# Patient Record
Sex: MALE | URBAN - METROPOLITAN AREA
[De-identification: names, ages, dates, MRNs, and addresses within clinical notes are randomized per-mention and may not be internally consistent; named-entity substitution may affect disease eponyms.]

---

## 2017-10-07 ENCOUNTER — OUTPATIENT (OUTPATIENT)
Dept: OUTPATIENT SERVICES | Facility: HOSPITAL | Age: 47
LOS: 1 days | Discharge: HOME | End: 2017-10-07

## 2017-10-07 DIAGNOSIS — Z13.1 ENCOUNTER FOR SCREENING FOR DIABETES MELLITUS: ICD-10-CM

## 2018-12-26 ENCOUNTER — OUTPATIENT (OUTPATIENT)
Dept: OUTPATIENT SERVICES | Facility: HOSPITAL | Age: 48
LOS: 1 days | Discharge: HOME | End: 2018-12-26

## 2018-12-26 DIAGNOSIS — Z00.00 ENCOUNTER FOR GENERAL ADULT MEDICAL EXAMINATION WITHOUT ABNORMAL FINDINGS: ICD-10-CM

## 2018-12-26 DIAGNOSIS — E78.00 PURE HYPERCHOLESTEROLEMIA, UNSPECIFIED: ICD-10-CM

## 2018-12-26 DIAGNOSIS — I10 ESSENTIAL (PRIMARY) HYPERTENSION: ICD-10-CM

## 2021-06-28 PROBLEM — Z00.00 ENCOUNTER FOR PREVENTIVE HEALTH EXAMINATION: Status: ACTIVE | Noted: 2021-06-28

## 2021-06-30 ENCOUNTER — APPOINTMENT (OUTPATIENT)
Dept: NEUROLOGY | Facility: CLINIC | Age: 51
End: 2021-06-30

## 2023-01-19 ENCOUNTER — APPOINTMENT (OUTPATIENT)
Dept: PHYSICAL MEDICINE AND REHAB | Facility: CLINIC | Age: 53
End: 2023-01-19
Payer: COMMERCIAL

## 2023-01-19 ENCOUNTER — APPOINTMENT (OUTPATIENT)
Dept: PHYSICAL MEDICINE AND REHAB | Facility: CLINIC | Age: 53
End: 2023-01-19

## 2023-01-19 VITALS
HEIGHT: 65 IN | HEART RATE: 84 BPM | BODY MASS INDEX: 20.83 KG/M2 | OXYGEN SATURATION: 93 % | DIASTOLIC BLOOD PRESSURE: 71 MMHG | WEIGHT: 125 LBS | SYSTOLIC BLOOD PRESSURE: 102 MMHG

## 2023-01-19 DIAGNOSIS — M54.16 RADICULOPATHY, LUMBAR REGION: ICD-10-CM

## 2023-01-19 PROCEDURE — 99205 OFFICE O/P NEW HI 60 MIN: CPT

## 2023-01-19 RX ORDER — GABAPENTIN 100 MG/1
100 CAPSULE ORAL
Qty: 90 | Refills: 1 | Status: ACTIVE | COMMUNITY
Start: 2023-01-19 | End: 1900-01-01

## 2023-01-23 NOTE — PHYSICAL EXAM
[FreeTextEntry1] : GEN: NAD, well dressed, well nourished\par HEENT: NCAT, oropharynx clear\par CV: S1S2, RRR\par RESP: on room air, no labored breathing\par ABD: non distended\par EXT: well perfused, no calf tenderness\par \par \par LEFT KNEE:\par no scars\par no effusion\par no laceration\par no increased warmth\par no erythema\par no varus deformity\par no valgus deformity\par absent J sign\par \par ROM\par Full range of motion in extension, no flexion contracture\par No added AROM/PROM with knee extension\par Full range of motion in flexion, 0-135\par \par Palpation\par + crepitus\par neg TTP over the quadriceps tendon\par neg TTP over the base of the patella\par neg TTP over the apex of the patella\par neg TTP over medial border of the patella\par neg TTP over lateral border of the patella\par neg TTP over the medial joint line\par neg TTP over the lateral joint line\par neg TTP over pes anserine area in the medial face of the tibia\par neg TTP over tibial tuberosity\par neg TTP over fibular head\par neg TTP over the popliteal fossa\par \par Manual Muscle Testing\par 4/5 in all planes with resisted isometric stress\par \par neg laxity with varus stress with the knee extended at 0\par neg laxity with varus stress with the knee extended at 30\par neg laxity with valgus stress with the knee extended at 0\par neg laxity with valgus stress with the knee extended at 30\par neg Lachmann Test\par neg Anterior drawer\par neg Posterior drawer\par neg Shankar’s Sign\par neg Patellar apprehension\par neg Apley’s Rotation-Distraction Test (increased rotation, ligamentous) vs contralateral limb\par +Apley’s Rotation-Compression Test (decreased rotation, meniscal) vs contralateral limb\par +Andrea Test with Tibia Externally Rotated (MM)\par neg Andrea Test with Tibia Internally Rotated (LM)\par \par Sensation intact to light touch over all aspects of the LEFt lower leg\par Distal pulses intact\par \par RIGHT KNEE:\par no scars\par no effusion\par no laceration\par no increased warmth\par no erythema\par no varus deformity\par no valgus deformity\par absent  J sign\par \par ROM\par Full range of motion in flexion, 5-135\par Limited range of motion with extension, lacking terminal 5\par \par Palpation\par + crepitus\par neg TTP over the quadriceps tendon\par neg TTP over the base of the patella\par neg TTP over the apex of the patella\par neg TTP over medial border of the patella\par neg TTP over lateral border of the patella\par +TTP over the medial joint line\par +TTP over the lateral joint line\par neg TTP over pes anserine area in the medial face of the tibia\par neg TTP over tibial tuberosity\par neg TTP over fibular head\par neg TTP over the popliteal fossa\par \par Manual Muscle Testing\par 4/5 in all planes with resisted isometric stress\par \par +laxity with varus stress with the knee extended at 0\par neg laxity with varus stress with the knee extended at 30\par neg laxity with valgus stress with the knee extended at 0\par neg laxity with valgus stress with the knee extended at 30\par neg Lachmann Test\par neg Anterior drawer\par neg Posterior drawer\par +Shankar’s Sign\par neg Patellar apprehension\par +Apley’s Rotation-Distraction Test (increased rotation, ligamentous) vs contralateral limb\par neg Apley’s Rotation-Compression Test (decreased rotation, meniscal) vs contralateral limb\par +Andrea Test with Tibia Externally Rotated (MM)\par neg Andrea Test with Tibia Internally Rotated (LM)\par \par Sensation decreased as below, right lower leg\par Distal pulses intact\par \par Heel to shin testing worse on the left, also impaired on right\par +Antalgic gait\par \par Common Peroneal Neuropathy\par \par + decreased sensation globally over the lateral right leg\par + numbness over all toes, right \par neg decreased sensation to the dorsum of the foot (SPN)\par neg decreased sensation to the web space between the first and second toes (DPN)\par neg decreased sensation to the distal two thirds of the medial aspect of the leg\par \par +partial foot drop\par +weakness of the tibialis anterior (DPN)\par +weakness of the peroneus (Longus: PF, eversion; Brevis: eversion; tertius: eversion, dorsiflex) (SPN)\par +weakness of the extensor digitorum longus, EDB (DPN)\par + weakness of the extensor hallucis longus muscles (DPN)\par \par Gait:\par +antalgic \par +  reciprocating heel to toe\par able to stand on toes and heels WITH hand holding\par Tandem gait intact WITH hand holding\par Poor single leg standing balance\par Romberg borderline\par \par Neg Trendelenburg sign with single leg stance\par Trendelenburg present with *** stance leg\par \par Inspection: Spine alignment is midline.\par Palpation: There is + tenderness over the midline spinous processes, paravertebral muscles of the thoracolumbar region\par Lumbar ROM: Flexion, extension, side-bending, rotation, limited in most planes\par pain with lateral flexion\par pain with oblique extension\par pain with lateral rotation \par \par Hip ROM: pain at terminal ROM bilaterally.\par FAIR, FABERE negative bilaterally.\par \par 	Hip Flex       Knee Ext      Ankle Dorsi           EHL        Ankle Plantar\par Right	3+/5	        4/5	                 4-/5	          4/5	             4/5                           \par Left	4-/5	        4+/5	                 4+/5	          4/5	             5-/5                           \par \par Hip abduction R 4+/5 L 5-/5\par Hip adduction R 4+/5 L 5-/5\par Hip extension R 4+/5 L 5-/5\par Knee Flexion R 4+/5 L 5-/5\par \par Tone: Normal. No clonus.\par Sensation: Grossly intact to light touch bilateral lower limbs.\par Proprioception: Intact at big toes bilaterally.\par Reflexes: 3+ symmetric knee jerk, ankle jerk. \par Plantars absent bilaterally.\par SLR negative bilaterally\par Crossed SLR negative bilaterally.\par Slump Test negative bilaterally\par \par prone gapping test neg B/L\par yeoman neg B.l\par nachlas neg b/l\par active hip extension was more difficult on neither side\par

## 2023-01-23 NOTE — ASSESSMENT
[FreeTextEntry1] :                                                       Assessment/Plan:\par \par KRISTIN RODRIGES is a 52 year male with right leg pain here for initial consultation.\par \par Peripheral neuropathy\par Weakness of the hip, right\par Lumbosacral spondylosis without myelopathy\par Chronic Lumbosacral Radiculopathy, L2, L3, L4 (Right)\par Balance impairment\par Dysmetria BLE\par H/o TIA(?)\par \par - Tiers of treatment and management of above diagnosis(es) were discussed with patient\par - Optimal diet, weight, sleep, and lifestyle management to minimize stress and maximize well being counseling provided\par - Imaging reviewed and discussed with patient\par - Reviewed previous encounter notes from Dr. HARJINDER Garland (Rad) on 7/2/2021 \par - Patient was advised to start a structured, targeted therapy program 2-3x/wk for 8 wks with goal toward HEP\par - Patient was educated on an appropriate home exercise program, provided with exercise recommendations, all questions answered\par - Patient was advised to start Naproxen 500 mg BID with food/milk for 5-7 days to help with pain and to decrease inflammation, afterwards as needed \par - sent for NCS/EMG: right leg weakness, global atrophy of the right lower leg, persistent balance issues, previous concern for peripheral neuropathy vs radiculopathy\par - locally directed treatment towards the peroneal nerve on the right would not render significant diagnostic or therapeutic value at this juncture, will consider again should diagnostic testing reveal no further findings\par - Patient was advised to apply cool compresses or warm heat to affected regions PRN\par \par - Patient was prescribed medrol dose pack (x2) with written instructions, all questions answered, informed of side effects of the medication.  Possible side effects, including hyperglycemia, GI upset, and GI bleed, reviewed with patient. In agreement with patient that potential pain reduction and anti-inflammatory benefits currently outweigh known side effect profile for oral corticosteroids. Patient instructed to immediately stop medication should she develop any abdominal pain, nausea, vomiting, bloody stools, or BRBPR\par \par - Educated about red flag symptoms including (but not limited to) new, worsened, or persistent: fever greater than 100F, bowel or bladder incontinence, bowel obstipation, inability to void urine, urinary leakage, Severe nausea or vomiting, Worsening numbness, worsening tingling/paresthesias, and/or new or progressive motor weakness; advised to seek immediate medical attention at his nearest Emergency department should they experience any of the above\par \par - Patient relates having minimal interest in locally directed treatment of their condition at this time, they were counseled on the role for local treatment as part of the tiers of treatment for their condition, all questions answered\par \par - MRI lumbar spine without contrast is indicated given that the pt has not improved with tylenol, ibuprofen, naproxen, meloxicam, they underwent non-diagnostic radiographic imaging of the region, and physical therapy/home exercise program>6 weeks. Patient's imaging is medically necessary to outline targets for locally (interventional) directed treatments and/or guide surgical management.\par \par - MRI brain and internal auditory canals without contrast is indicated given that the pt has not improved over time despite treatments, last imaging study reflected non-specific changes with focal exam findings at today's visit. Patient's imaging is medically necessary to outline targets for locally (interventional) directed treatments and/or guide surgical management.\par \par - Follow up in 2-3 weeks after imaging, in person in 2 months to assess their progress\par \par I have personally spent a total of at least 65 minutes preparing, reviewing internal and external records, explaining, counseling, and coordinating care for this patient encounter.\par \par Thank you, , for allowing me to participate in the care of your patient. Please do not hesitate to contact me with questions/concerns.\par \par Edgar Viramontes M.D.\par Sports and Interventional Spine\[facility] Department of Physical Medicine and Rehabilitation \par [facility] \par Email: [email or web address] \par [facility]\par [facility] [place] [facility] [address]\par [address], [address]\par [place] [id number]\[facility] \[facility] Appointments: [phone]\[facility] Fax: [phone]\[facility]

## 2023-01-23 NOTE — HISTORY OF PRESENT ILLNESS
[FreeTextEntry1] : Edgar Viramontes M.D.\par Sports Medicine and Interventional Spine\par Department of Physical Medicine and Rehabilitation \par [facility] \par Email: [email or web address] <[email or web address]>\par \par [facility]\par [facility] [place] [facility]\par [address]\par [address], [address]th Floor\[place] [address]\par \par [facility]\par [facility] [place] at [facility]\par [address], 4th Floor\[place] [address]\par \par [facility] at \par [facility]\par [address], 6th [address]\[place] [address]\par \par [facility] at [facility]\par [address] [facility]\[place] [address]\par \par [facility] [place] \par [facility]\par [address] [facility] [address]\[place] [address]\par \par For [place] Appointments\par Phone: [phone]\par Fax: [phone]\par \par For [place] Appointments\par Phone: [phone]\par Fax: [phone]\par \par \par ----------------------------------------------------------------------------------------------------------------------------------------\par \par PATIENT: KRISTIN RODRIGES \par MRN: [id number] \par YOB: 1970 \par DATE OF VISIT: 01/19/2023 \par Referred by Unable to Collect PCP\par PCP ADDRESS:\par \par Jan 19, 2023 \par \par \par Dear  \par \par Thank you for referring KRISTIN RODRIGES to my Sports and Interventional Spine practice and office. Enclosed is a copy of the patient's consultation/progress note, which includes my complete assessment and recent studies completed during the patient's evaluation.\par \par If you have questions or have any patients who require nonsurgical, non-opiate management of any sports, spine, or musculoskeletal conditions, please do not hesitate to contact my , Ana Hilton at [phone].\par \par I look forward to taking care of your patients along with you.\par \par Sincerely,\par \par Edgar\par \par Edgar Viramontes MD\par Sports, Interventional Spine, & Regenerative Musculoskeletal Medicine\par [facility] [place] [facility]\par Email: [email or web address]\par \par \par                                                   Initial Consultation:\par CC: pain\par \par HPI:  This is the first visit to [facility] [place] [facility] Sports Medicine and Interventional Spine Practice.  \par \par KRISTIN RODRIGES presents with the chief complaint as above.  \par \par Initial Hx on 01/19/2023 :\[facility]\par cannot cite inciting event\par patient reports waking one day [6/26/2021] with pain and decreased range of motion of the arms, legs\par patient reports having one episode on above day due to inability to move, possible sciatica\par patient reports being hospitalized at that time for about 10 days\par patient diagnosed with peripheral neuropathy at that time\par largely in pain since then, has undergone multiple ALEE, cervical and lumbar (x2); 12/2022 [facility]\par \par pain, pinching pain and numbness occurs in the right lower limb in an L5, S1, and L4 distribution \par including superficial peroneal nerve, sural, lateral dorsal cutaneous nerve, saphenous distribution in the right lower leg\par \par The patient’s difficulties began 6/2021\par The pain is graded as 8/10\par The pain is described as aching, throbbing, shooting pain\par the leg is also pruritic often, xerotic\par The pain is intermittent\par The pain does not radiate\par The pain radiates in the Lower limbs in a L4, L5, S1 distribution\par The patient feels that the pain is overall persistent\par Patient denies other recent fall, MVA, injury, trauma, or accident besides presenting history above\par \par Aggravating: cannot cite, walking (3-4 city blocks, slow walking)\par Alleviating: rest, activity modification (standing break every half hour), avoiding prolonged walking, pharmacologic treatments\par \par Meds: denies regular PO pain medications; gabapentin 100mg PRN; \par Therapy Program: no recent structured targeted therapy program\par HEP: doing HEP regularly\par \par Assoc Sx:\par Reports intermittent numbness, tingling paresthesia in the right LOWER limbs in a non-dermatomal distribution\par Otherwise denies numbness, Tingling\par \par Denies Focal motor weakness in the upper or lower limbs\par Denies New or worsened bowel or bladder incontinence\par Denies Saddle anesthesia\par Denies Buckling\par Denies Using Orthotic(s)/Supportive devices\par Denies Swelling in the upper/lower extremities\par Denies Clicking\par They also deny frequent tripping, falling\par \par ROS: A 14 point review of systems was completed. Positive findings are pain as described above. The remaining systems negative.\par \par Prostate Hx: up to date\par COVID HX: reviewed\par \par Assoc Hx:\par Ambulates without assistive device\par Injection Hx: denies locally directed treatment to the area in question\par Imaging Hx: reviewed\par \par Level of functioning: indep with ambulation, indep with ADLs\par Living Situation: dwelling with steps to enter

## 2023-03-06 ENCOUNTER — APPOINTMENT (OUTPATIENT)
Dept: PHYSICAL MEDICINE AND REHAB | Facility: CLINIC | Age: 53
End: 2023-03-06
Payer: COMMERCIAL

## 2023-03-06 DIAGNOSIS — G62.89 OTHER SPECIFIED POLYNEUROPATHIES: ICD-10-CM

## 2023-03-06 DIAGNOSIS — R26.89 OTHER ABNORMALITIES OF GAIT AND MOBILITY: ICD-10-CM

## 2023-03-06 DIAGNOSIS — R29.898 OTHER SYMPTOMS AND SIGNS INVOLVING THE MUSCULOSKELETAL SYSTEM: ICD-10-CM

## 2023-03-06 DIAGNOSIS — M62.561 MUSCLE WASTING AND ATROPHY, NOT ELSEWHERE CLASSIFIED, RIGHT LOWER LEG: ICD-10-CM

## 2023-03-06 DIAGNOSIS — M47.817 SPONDYLOSIS W/OUT MYELOPATHY OR RADICULOPATHY, LUMBOSACRAL REGION: ICD-10-CM

## 2023-03-06 DIAGNOSIS — M51.9 UNSPECIFIED THORACIC, THORACOLUMBAR AND LUMBOSACRAL INTERVERTEBRAL DISC DISORDER: ICD-10-CM

## 2023-03-06 PROCEDURE — 99215 OFFICE O/P EST HI 40 MIN: CPT | Mod: 95

## 2023-03-06 NOTE — PHYSICAL EXAM
[FreeTextEntry1] : General: NAD, pleasant\par Psych: Mood and affect appropriate\par HEENT: NC/AT\par CV: S1, S2\par Pulm:breathing unlabored\par Lymph: Patient reports and shows no enlarged cervical lymph nodes\par Pulses: Patient reports palpable radial and ulnar pulses bilateral upper extremities\par Skin: Patient reports and shows no visible rash, ecchymoses, or erythema\par

## 2023-03-06 NOTE — REASON FOR VISIT
[Follow-Up] : a follow-up visit [Home] : at home, [unfilled] , at the time of the visit. [Medical Office: ([facility])___] : at the medical office located in  [Patient] : the patient

## 2023-03-06 NOTE — HISTORY OF PRESENT ILLNESS
[FreeTextEntry1] : Edgar Viramontes M.D.\par Sports Medicine and Interventional Spine\par Department of Physical Medicine and Rehabilitation \par [facility] \par Email: [email or web address] <[email or web address]>\par \par [facility]\par [facility] [place] [facility]\par [address]\par [address]th Floor\[place] [address]\par \par [facility]\par [facility] [place] at [facility]\par [address], 4th Floor\[place] [address]\par \par [facility] at \par [facility]\par [address] 6th Floor\[place] [address]\par \par [facility] at [facility]\par [address] [facility]\[place] [address]\par \par [facility] Physician [facility] [place] \par [facility]\par [address] [facility]\[place] [address]\par \par For [place] Appointments\par Phone: [phone]\par Fax: [phone]\par \par For [place] Appointments\par Phone: [phone]\par Fax: [phone]\par \par \par ----------------------------------------------------------------------------------------------------------------------------------------\par \par PATIENT: KRISTIN RODRIGES \par MRN: [id number] \par YOB: 1970 \par DATE OF VISIT: 3/6/2023\par Referred by Unable to Collect PCP\par PCP ADDRESS:\par \par Mar 06, 2023 \par \par Dear  \par \par Thank you for referring KRISTIN RODRIGES to my Sports and Interventional Spine practice and office. Enclosed is a copy of the patient's consultation/progress note, which includes my complete assessment and recent studies completed during the patient's evaluation.\par \par If you have questions or have any patients who require nonsurgical, non-opiate management of any sports, spine, or musculoskeletal conditions, please do not hesitate to contact my , Ana Hilton at [phone].\par \par I look forward to taking care of your patients along with you.\par \par Sincerely,\par \par Edgar\par \par Edgar Viramontes MD\par Sports, Interventional Spine, & Regenerative Musculoskeletal Medicine\par [facility] [place] [facility]\par Email: [email or web address]\par \par \par                                                   Follow up Visit\par CC: pain\par \par HPI:  This is a follow up telemedicine visit to [facility] [place] [facility] Sports Medicine and Interventional Spine Practice.  \par \par KRISTIN RODRIGES presents with the chief complaint as above.  \par \par Interval Hx on Mar 06, 2023: presents for follow up. Since last visit, they have started PT treatments, 1-2 x per week for the past three weeks. Patient had their Brain and lumbar MRI performed. Patient informed of all relevant imaging findings, all questions were answered. Informed of their worsened findings at L3/4, L4/5 without clear.\par There have been no significant changes to their aggravating or alleviating factors since the last visit. Pharmacologic treatments now include OTC analgesics PRN, but otherwise pharmacologic treatments are minimal.\par Denies new or worsened numbness, tingling, or focal motor deficit. Denies interval fall, accident, or injury. Denies change in bowel or bladder functioning. Patient started gabapentin 800mg at bedtime. Patient requested this from their previous pain management doctor, received refill from their PCP.  \par \par Meds: denies regular PO pain medications; gabapentin 100mg PRN; \par Therapy Program: no recent structured targeted therapy program\par HEP: doing HEP regularly\par \par Assoc Sx:\par Reports intermittent numbness, tingling paresthesia in the right LOWER limbs in a non-dermatomal distribution\par Otherwise denies numbness, Tingling\par \par Denies Focal motor weakness in the upper or lower limbs\par Denies New or worsened bowel or bladder incontinence\par Denies Saddle anesthesia\par Denies Buckling\par Denies Using Orthotic(s)/Supportive devices\par Denies Swelling in the upper/lower extremities\par Denies Clicking\par They also deny frequent tripping, falling\par \par ROS: A 14 point review of systems was completed. Positive findings are pain as described above. The remaining systems negative.\par \par Prostate Hx: up to date\par COVID HX: reviewed\par \par Initial Hx on 01/19/2023: Presents in person to [facility]. cannot cite inciting event. patient reports waking one day [6/26/2021] with pain and decreased range of motion of the arms, legs. patient reports having one episode on above day due to inability to move, possible sciatica. patient reports being hospitalized at that time for about 10 days. patient diagnosed with peripheral neuropathy at that time. largely in pain since then, has undergone multiple ALEE, cervical and lumbar (x2); 12/20[facility] at [facility]. pain, pinching pain and numbness occurs in the right lower limb in an L5, S1, and L4 distribution. including superficial peroneal nerve, sural, lateral dorsal cutaneous nerve, saphenous distribution in the right lower leg. The patient’s difficulties began 6/2021. The pain is graded as 8/10. The pain is described as aching, throbbing, shooting pain. the leg is also pruritic often, xerotic\par The pain is intermittent. The pain does not radiate. The pain radiates in the Lower limbs in a L4, L5, S1 distribution\par The patient feels that the pain is overall persistent. Patient denies other recent fall, MVA, injury, trauma, or accident besides presenting history above.  Aggravating: cannot cite, walking (3-4 city blocks, slow walking). Alleviating: rest, activity modification (standing break every half hour), avoiding prolonged walking, pharmacologic treatments\par \par Assoc Hx:\par Ambulates without assistive device\par Injection Hx: denies locally directed treatment to the area in question\par Imaging Hx: reviewed\par \par Level of functioning: indep with ambulation, indep with ADLs\par Living Situation: dwelling with steps to enter

## 2023-03-06 NOTE — ASSESSMENT
[FreeTextEntry1] :                                                       Assessment/Plan:\par \par KRISTIN RODRIGES is a 52 year male with right leg pain here for telemedicine follow up [[phone]].\par \par Peripheral neuropathy\par Weakness of the hip, right\par Lumbosacral spondylosis without myelopathy\par Chronic Lumbosacral Radiculopathy, L2, L3, L4 (Right)\par Balance impairment\par Dysmetria BLE\par H/o TIA(?)\par \par - Tiers of treatment and management of above diagnosis(es) were discussed with patient\par - Optimal diet, weight, sleep, and lifestyle management to minimize stress and maximize well being counseling provided\par - Imaging reviewed and discussed with patient\par - Reviewed previous encounter notes from Dr. HARJINDER Garland (Rad) on 7/2/2021 \par - Patient was advised to continue their structured, targeted therapy program 2-3x/wk for 8 wks with goal toward HEP\par - Patient was educated on an appropriate home exercise program, provided with exercise recommendations, all questions answered\par - Patient may trial acetaminophen 1000mg up to TID PRN moderate to severe pain and to decrease average consumption of NSAIDs\par - sent for NCS/EMG: right leg weakness, global atrophy of the right lower leg, persistent balance issues, previous concern for peripheral neuropathy vs radiculopathy\par - locally directed treatment towards the peroneal nerve on the right would not render significant diagnostic or therapeutic value at this juncture, will consider again should diagnostic testing reveal no further findings\par - Patient should repeat their CMP to review and gauge risk of taking 800mg\par - Patient was advised to apply cool compresses or warm heat to affected regions PRN\par - Patient taking gabapentin per their specialist recommendations, advised to loop back in with them\par \par - Educated about red flag symptoms including (but not limited to) new, worsened, or persistent: fever greater than 100F, bowel or bladder incontinence, bowel obstipation, inability to void urine, urinary leakage, Severe nausea or vomiting, Worsening numbness, worsening tingling/paresthesias, and/or new or progressive motor weakness; advised to seek immediate medical attention at his nearest Emergency department should they experience any of the above\par \par - Patient relates having minimal interest in locally directed treatment of their condition at this time, they were counseled on the role for local treatment as part of the tiers of treatment for their condition, all questions answered\par \par - Follow up in person in 4-6 weeks after completing physical therapy program.\par \par I have personally spent a total of at least 50 minutes preparing, reviewing internal and external records, explaining, counseling, and coordinating care for this patient encounter.\par \par Thank you, Dr, for allowing me to participate in the care of your patient. Please do not hesitate to contact me with questions/concerns.\par \par Edgar Viramontes M.D.\[facility]\par Department of Physical Medicine and Rehabilitation \par [facility] \par Email: [email or web address] \par [facility]\par [facility]\par [address]\par [facility], [address]\[place] [address]\par \par Appointments: [phone]\par Fax: [phone]\par

## 2023-03-09 ENCOUNTER — APPOINTMENT (OUTPATIENT)
Dept: PHYSICAL MEDICINE AND REHAB | Facility: CLINIC | Age: 53
End: 2023-03-09

## 2023-10-25 ENCOUNTER — APPOINTMENT (OUTPATIENT)
Dept: NEUROLOGY | Facility: CLINIC | Age: 53
End: 2023-10-25
Payer: COMMERCIAL

## 2023-10-25 VITALS
WEIGHT: 125 LBS | DIASTOLIC BLOOD PRESSURE: 80 MMHG | HEIGHT: 65 IN | OXYGEN SATURATION: 95 % | HEART RATE: 79 BPM | TEMPERATURE: 98 F | BODY MASS INDEX: 20.83 KG/M2 | SYSTOLIC BLOOD PRESSURE: 110 MMHG

## 2023-10-25 DIAGNOSIS — G90.521 COMPLEX REGIONAL PAIN SYNDROME I OF RIGHT LOWER LIMB: ICD-10-CM

## 2023-10-25 PROCEDURE — 99203 OFFICE O/P NEW LOW 30 MIN: CPT

## 2023-10-25 RX ORDER — ROSUVASTATIN CALCIUM 10 MG/1
10 TABLET, FILM COATED ORAL DAILY
Refills: 0 | Status: ACTIVE | COMMUNITY

## 2023-10-25 RX ORDER — MECLIZINE HYDROCHLORIDE 25 MG/1
25 TABLET ORAL DAILY
Refills: 0 | Status: ACTIVE | COMMUNITY

## 2023-10-25 RX ORDER — TRAZODONE HYDROCHLORIDE 50 MG/1
50 TABLET ORAL
Refills: 0 | Status: ACTIVE | COMMUNITY

## 2023-10-25 RX ORDER — TROSPIUM CHLORIDE 60 MG/1
60 CAPSULE, EXTENDED RELEASE ORAL DAILY
Refills: 0 | Status: ACTIVE | COMMUNITY

## 2023-10-25 RX ORDER — MIRABEGRON 25 MG/1
25 TABLET, FILM COATED, EXTENDED RELEASE ORAL DAILY
Refills: 0 | Status: ACTIVE | COMMUNITY

## 2023-10-25 RX ORDER — FLUOXETINE HYDROCHLORIDE 10 MG/1
10 CAPSULE ORAL DAILY
Refills: 0 | Status: ACTIVE | COMMUNITY

## 2023-10-25 RX ORDER — ALPRAZOLAM 1 MG/1
1 TABLET ORAL
Refills: 0 | Status: ACTIVE | COMMUNITY

## 2023-10-25 RX ORDER — OXYCODONE HYDROCHLORIDE 5 MG/1
5 CAPSULE ORAL
Refills: 0 | Status: ACTIVE | COMMUNITY

## 2023-10-25 RX ORDER — GABAPENTIN 800 MG/1
800 TABLET, COATED ORAL DAILY
Refills: 0 | Status: ACTIVE | COMMUNITY

## 2023-10-25 RX ORDER — PANTOPRAZOLE SODIUM 40 MG/1
40 GRANULE, DELAYED RELEASE ORAL TWICE DAILY
Refills: 0 | Status: ACTIVE | COMMUNITY

## 2023-10-25 RX ORDER — LEVOTHYROXINE SODIUM 25 UG/1
25 CAPSULE ORAL DAILY
Refills: 0 | Status: ACTIVE | COMMUNITY

## 2023-10-25 RX ORDER — FLUOXETINE HYDROCHLORIDE 20 MG/1
20 TABLET ORAL DAILY
Refills: 0 | Status: ACTIVE | COMMUNITY

## 2023-10-25 RX ORDER — ASCORBIC ACID 1000 MG
TABLET ORAL DAILY
Refills: 0 | Status: ACTIVE | COMMUNITY

## 2023-10-25 RX ORDER — FAMOTIDINE 40 MG/1
40 TABLET, FILM COATED ORAL DAILY
Refills: 0 | Status: ACTIVE | COMMUNITY

## 2023-12-27 ENCOUNTER — APPOINTMENT (OUTPATIENT)
Dept: NEUROLOGY | Facility: CLINIC | Age: 53
End: 2023-12-27

## 2024-09-03 ENCOUNTER — TRANSCRIPTION ENCOUNTER (OUTPATIENT)
Age: 54
End: 2024-09-03

## 2024-12-19 ENCOUNTER — TRANSCRIPTION ENCOUNTER (OUTPATIENT)
Age: 54
End: 2024-12-19

## 2025-01-08 ENCOUNTER — APPOINTMENT (OUTPATIENT)
Dept: NEUROLOGY | Age: 55
End: 2025-01-08
Payer: COMMERCIAL

## 2025-01-08 ENCOUNTER — NON-APPOINTMENT (OUTPATIENT)
Age: 55
End: 2025-01-08

## 2025-01-08 VITALS
WEIGHT: 127 LBS | BODY MASS INDEX: 21.16 KG/M2 | RESPIRATION RATE: 17 BRPM | DIASTOLIC BLOOD PRESSURE: 65 MMHG | TEMPERATURE: 97.8 F | HEIGHT: 65 IN | SYSTOLIC BLOOD PRESSURE: 93 MMHG | HEART RATE: 74 BPM | OXYGEN SATURATION: 97 %

## 2025-01-08 DIAGNOSIS — G62.89 OTHER SPECIFIED POLYNEUROPATHIES: ICD-10-CM

## 2025-01-08 DIAGNOSIS — M51.9 UNSPECIFIED THORACIC, THORACOLUMBAR AND LUMBOSACRAL INTERVERTEBRAL DISC DISORDER: ICD-10-CM

## 2025-01-08 DIAGNOSIS — M54.16 RADICULOPATHY, LUMBAR REGION: ICD-10-CM

## 2025-01-08 DIAGNOSIS — M62.561 MUSCLE WASTING AND ATROPHY, NOT ELSEWHERE CLASSIFIED, RIGHT LOWER LEG: ICD-10-CM

## 2025-01-08 DIAGNOSIS — R26.89 OTHER ABNORMALITIES OF GAIT AND MOBILITY: ICD-10-CM

## 2025-01-08 PROCEDURE — 99205 OFFICE O/P NEW HI 60 MIN: CPT

## 2025-01-09 ENCOUNTER — TRANSCRIPTION ENCOUNTER (OUTPATIENT)
Age: 55
End: 2025-01-09

## 2025-01-09 LAB
CK SERPL-CCNC: 143 U/L
CRP SERPL-MCNC: <3 MG/L
ERYTHROCYTE [SEDIMENTATION RATE] IN BLOOD BY WESTERGREN METHOD: 18 MM/HR
FOLATE SERPL-MCNC: 17.4 NG/ML
VIT B12 SERPL-MCNC: 756 PG/ML

## 2025-01-10 ENCOUNTER — TRANSCRIPTION ENCOUNTER (OUTPATIENT)
Age: 55
End: 2025-01-10

## 2025-01-10 LAB
GLIADIN IGA SER QL: 1.1 U/ML
GLIADIN IGG SER QL: <0.4 U/ML
GLIADIN PEPTIDE IGA SER-ACNC: NEGATIVE
GLIADIN PEPTIDE IGG SER-ACNC: NEGATIVE
HCV AB SER QL: NONREACTIVE
HCV S/CO RATIO: 0.14 S/CO
HCYS SERPL-MCNC: 9.7 UMOL/L
TTG IGA SER IA-ACNC: <0.5 U/ML
TTG IGA SER-ACNC: NEGATIVE
TTG IGG SER IA-ACNC: <0.8 U/ML
TTG IGG SER IA-ACNC: NEGATIVE

## 2025-01-13 LAB
ALBUMIN MFR SERPL ELPH: 65.3 %
ALBUMIN SERPL-MCNC: 4.5 G/DL
ALBUMIN/GLOB SERPL: 1.9 RATIO
ALPHA1 GLOB MFR SERPL ELPH: 4.3 %
ALPHA1 GLOB SERPL ELPH-MCNC: 0.3 G/DL
ALPHA2 GLOB MFR SERPL ELPH: 10.7 %
ALPHA2 GLOB SERPL ELPH-MCNC: 0.7 G/DL
ASIALO-GM1 ANTIBODIES, IGG/IGM: 14 IV
B-GLOBULIN MFR SERPL ELPH: 9.2 %
B-GLOBULIN SERPL ELPH-MCNC: 0.6 G/DL
GAMMA GLOB FLD ELPH-MCNC: 0.7 G/DL
GAMMA GLOB MFR SERPL ELPH: 10.5 %
GD1A ANTIBODIES, IGG/IGM: 9 IV
GD1B ANTIBODIES, IGG/IGM: 10 IV
GM1 ANTIBODIES, IGG/IGM: 11 IV
GM2 ANTIBODIES, IGG/IGM: 8 IV
GQ1B ANTIBODIES, IGG/IGM: 6 IV
INTERPRETATION SERPL IEP-IMP: NORMAL
M PROTEIN SPEC IFE-MCNC: NORMAL
METHYLMALONATE SERPL-SCNC: 159 NMOL/L
PROT SERPL-MCNC: 6.9 G/DL
PROT SERPL-MCNC: 6.9 G/DL

## 2025-01-14 LAB
VIT B1 SERPL-MCNC: 170 NMOL/L
VIT B6 SERPL-MCNC: 13.8 UG/L

## 2025-01-15 LAB
ANACR T: NEGATIVE
ENDOMYSIUM IGA SER QL: NEGATIVE
ENDOMYSIUM IGA TITR SER: NORMAL

## 2025-01-16 ENCOUNTER — NON-APPOINTMENT (OUTPATIENT)
Age: 55
End: 2025-01-16

## 2025-01-16 ENCOUNTER — APPOINTMENT (OUTPATIENT)
Dept: NEUROLOGY | Age: 55
End: 2025-01-16
Payer: COMMERCIAL

## 2025-01-16 VITALS
RESPIRATION RATE: 17 BRPM | SYSTOLIC BLOOD PRESSURE: 111 MMHG | OXYGEN SATURATION: 98 % | DIASTOLIC BLOOD PRESSURE: 73 MMHG | HEIGHT: 65 IN | BODY MASS INDEX: 21.16 KG/M2 | TEMPERATURE: 97.8 F | HEART RATE: 77 BPM | WEIGHT: 127 LBS

## 2025-01-16 DIAGNOSIS — M79.2 NEURALGIA AND NEURITIS, UNSPECIFIED: ICD-10-CM

## 2025-01-16 PROCEDURE — 11105 PUNCH BX SKIN EA SEP/ADDL: CPT

## 2025-01-16 PROCEDURE — 11104 PUNCH BX SKIN SINGLE LESION: CPT

## 2025-01-16 RX ORDER — PREGABALIN 25 MG/1
25 CAPSULE ORAL TWICE DAILY
Refills: 0 | Status: ACTIVE | COMMUNITY

## 2025-01-17 LAB
AMPHIPHYSIN IGG TITR SER IF: NEGATIVE
ANNOTATION COMMENT IMP: NORMAL
CV2 IGG TITR SER: NEGATIVE
GLIAL NUC TYPE 1 AB TITR SER: NEGATIVE
HU1 AB TITR SER: NEGATIVE
HU2 AB TITR SER IF: NEGATIVE
HU3 AB TITR SER: NEGATIVE
INTERPRETIVE COMMENTS: NORMAL
PCA-1 AB TITR SER: NEGATIVE
PCA-2 AB TITR SER: NEGATIVE
PCA-TR AB TITR SER: NEGATIVE
VGCC-P/Q BIND AB SER-SCNC: 0 NMOL/L
VGKC AB SER-SCNC: 0 NMOL/L

## 2025-01-23 ENCOUNTER — TRANSCRIPTION ENCOUNTER (OUTPATIENT)
Age: 55
End: 2025-01-23

## 2025-02-05 ENCOUNTER — APPOINTMENT (OUTPATIENT)
Dept: NEUROLOGY | Age: 55
End: 2025-02-05
Payer: COMMERCIAL

## 2025-02-05 VITALS
TEMPERATURE: 96.3 F | WEIGHT: 121 LBS | OXYGEN SATURATION: 95 % | HEIGHT: 65 IN | SYSTOLIC BLOOD PRESSURE: 134 MMHG | HEART RATE: 95 BPM | BODY MASS INDEX: 20.16 KG/M2 | DIASTOLIC BLOOD PRESSURE: 75 MMHG

## 2025-02-05 DIAGNOSIS — M54.16 RADICULOPATHY, LUMBAR REGION: ICD-10-CM

## 2025-02-05 DIAGNOSIS — Z82.5 FAMILY HISTORY OF ASTHMA AND OTHER CHRONIC LOWER RESPIRATORY DISEASES: ICD-10-CM

## 2025-02-05 DIAGNOSIS — Z82.49 FAMILY HISTORY OF ISCHEMIC HEART DISEASE AND OTHER DISEASES OF THE CIRCULATORY SYSTEM: ICD-10-CM

## 2025-02-05 DIAGNOSIS — G62.89 OTHER SPECIFIED POLYNEUROPATHIES: ICD-10-CM

## 2025-02-05 DIAGNOSIS — Z80.42 FAMILY HISTORY OF MALIGNANT NEOPLASM OF PROSTATE: ICD-10-CM

## 2025-02-05 DIAGNOSIS — Z87.891 PERSONAL HISTORY OF NICOTINE DEPENDENCE: ICD-10-CM

## 2025-02-05 DIAGNOSIS — M62.561 MUSCLE WASTING AND ATROPHY, NOT ELSEWHERE CLASSIFIED, RIGHT LOWER LEG: ICD-10-CM

## 2025-02-05 DIAGNOSIS — M51.9 UNSPECIFIED THORACIC, THORACOLUMBAR AND LUMBOSACRAL INTERVERTEBRAL DISC DISORDER: ICD-10-CM

## 2025-02-05 DIAGNOSIS — M79.2 NEURALGIA AND NEURITIS, UNSPECIFIED: ICD-10-CM

## 2025-02-05 DIAGNOSIS — G90.521 COMPLEX REGIONAL PAIN SYNDROME I OF RIGHT LOWER LIMB: ICD-10-CM

## 2025-02-05 PROCEDURE — 99214 OFFICE O/P EST MOD 30 MIN: CPT

## 2025-02-19 ENCOUNTER — TRANSCRIPTION ENCOUNTER (OUTPATIENT)
Age: 55
End: 2025-02-19

## 2025-02-24 ENCOUNTER — TRANSCRIPTION ENCOUNTER (OUTPATIENT)
Age: 55
End: 2025-02-24

## 2025-02-27 ENCOUNTER — TRANSCRIPTION ENCOUNTER (OUTPATIENT)
Age: 55
End: 2025-02-27

## 2025-05-01 ENCOUNTER — APPOINTMENT (OUTPATIENT)
Dept: NEUROLOGY | Age: 55
End: 2025-05-01

## 2025-05-01 DIAGNOSIS — G90.521 COMPLEX REGIONAL PAIN SYNDROME I OF RIGHT LOWER LIMB: ICD-10-CM

## 2025-05-01 DIAGNOSIS — M51.9 UNSPECIFIED THORACIC, THORACOLUMBAR AND LUMBOSACRAL INTERVERTEBRAL DISC DISORDER: ICD-10-CM

## 2025-05-01 DIAGNOSIS — M54.16 RADICULOPATHY, LUMBAR REGION: ICD-10-CM

## 2025-06-23 ENCOUNTER — TRANSCRIPTION ENCOUNTER (OUTPATIENT)
Age: 55
End: 2025-06-23